# Patient Record
Sex: MALE | Race: WHITE | URBAN - METROPOLITAN AREA
[De-identification: names, ages, dates, MRNs, and addresses within clinical notes are randomized per-mention and may not be internally consistent; named-entity substitution may affect disease eponyms.]

---

## 2017-08-20 ENCOUNTER — HOSPITAL ENCOUNTER (EMERGENCY)
Facility: CLINIC | Age: 62
Discharge: HOME | End: 2017-08-20
Attending: EMERGENCY MEDICINE | Admitting: EMERGENCY MEDICINE
Payer: COMMERCIAL

## 2017-08-20 VITALS
DIASTOLIC BLOOD PRESSURE: 89 MMHG | OXYGEN SATURATION: 97 % | SYSTOLIC BLOOD PRESSURE: 164 MMHG | TEMPERATURE: 98.1 F | HEART RATE: 93 BPM | WEIGHT: 210 LBS | RESPIRATION RATE: 20 BRPM

## 2017-08-20 DIAGNOSIS — S61.012A LACERATION OF LEFT THUMB WITHOUT FOREIGN BODY WITHOUT DAMAGE TO NAIL, INITIAL ENCOUNTER: ICD-10-CM
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
## 2017-08-20 DIAGNOSIS — S61.152A: ICD-10-CM

## 2017-08-20 DIAGNOSIS — S61.259A DOG BITE OF FINGER, INITIAL ENCOUNTER: ICD-10-CM
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
## 2017-08-20 PROCEDURE — 12002 RPR S/N/AX/GEN/TRNK2.6-7.5CM: CPT | Mod: Z6 | Performed by: EMERGENCY MEDICINE

## 2017-08-20 PROCEDURE — 12002 RPR S/N/AX/GEN/TRNK2.6-7.5CM: CPT | Performed by: EMERGENCY MEDICINE

## 2017-08-20 PROCEDURE — 99283 EMERGENCY DEPT VISIT LOW MDM: CPT | Mod: 25 | Performed by: EMERGENCY MEDICINE

## 2017-08-20 RX ORDER — BUPIVACAINE HYDROCHLORIDE 2.5 MG/ML
5 INJECTION, SOLUTION EPIDURAL; INFILTRATION; INTRACAUDAL ONCE
Status: DISCONTINUED | OUTPATIENT
Start: 2017-08-20 | End: 2017-08-20 | Stop reason: HOSPADM

## 2017-08-20 NOTE — ED NOTES
Pt was  his dog and his son in laws dog during a fight.  Pt was bitten on his left 1st digit.  Dog is vaccinated.  Laceration to Thumb.

## 2017-08-20 NOTE — ED AVS SNAPSHOT
[facility]    [address] [place] [address]    Phone:  [phone]    Fax:  [phone]                                       Bernardo Shields   MRN: [id number]    Department:  [facility]   Date of Visit:  8/20/2017           Patient Information     Date Of Birth          1955        Your diagnoses for this visit were:     Dog bite of finger, initial encounter     Laceration of left thumb without foreign body without damage to nail, initial encounter        You were seen by Lesly Courtney MD.      Follow-up Information     Follow up with your clinic.    Why:  For suture removal in 7-10 days        Discharge Instructions       Keep wound clean and dry. No soaking. Okay to shower.    Antibiotics as prescribed.    Elevation will help with pain and inflammation.    Watch for any signs of infection and follow up in clinic or return to ED for concerns.    I hope you heal quickly!!    24 Hour Appointment Hotline       To make an appointment at any [facility] clinic, call [phone] ([phone]). If you don't have a family doctor or clinic, we will help you find one. [facility] clinics are conveniently located to serve the needs of you and your family.             Review of your medicines      START taking        Dose / Directions Last dose taken    amoxicillin-clavulanate 875-125 MG per tablet   Commonly known as:  AUGMENTIN   Dose:  1 tablet   Quantity:  14 tablet        Take 1 tablet by mouth 2 times daily for 7 days   Refills:  0          Our records show that you are taking the medicines listed below. If these are incorrect, please call your family doctor or clinic.        Dose / Directions Last dose taken    ADVIL PO   Dose:  600 mg        Take 600 mg by mouth   Refills:  0                Prescriptions were sent or printed at these locations (1 Prescription)                   [facility] [place], [place] -  "[address]   [address] , [place] [address]    Telephone:  [phone]   Fax:  [phone]   Hours:                  E-Prescribed (1 of 1)         amoxicillin-clavulanate (AUGMENTIN) 875-125 MG per tablet                Orders Needing Specimen Collection     None      Pending Results     No orders found from 2017 to 2017.            Pending Culture Results     No orders found from 2017 to 2017.            Pending Results Instructions     If you had any lab results that were not finalized at the time of your Discharge, you can call the ED Lab Result RN at [phone]. You will be contacted by this team for any positive Lab results or changes in treatment. The nurses are available 7 days a week from 10A to 6:30P.  You can leave a message 24 hours per day and they will return your call.        Thank you for choosing [facility]       Thank you for choosing [facility] for your care. Our goal is always to provide you with excellent care. Hearing back from our patients is one way we can continue to improve our services. Please take a few minutes to complete the written survey that you may receive in the mail after you visit with us. Thank you!        [employer] Information     [employer] lets you send messages to your doctor, view your test results, renew your prescriptions, schedule appointments and more. To sign up, go to [email or web address] . Click on \"Log in\" on the left side of the screen, which will take you to the Welcome page. Then click on \"Sign up Now\" on the right side of the page.     You will be asked to enter the access code listed below, as well as some personal information. Please follow the directions to create your username and password.     Your access code is: [email or web address]  Expires: 2017  1:12 PM     Your access code will  in 90 days. If you need help or a new code, please call your [facility] clinic or [phone].        Care EveryWhere ID     This is your Care " EveryWhere ID. This could be used by other organizations to access your [facility] medical records  [id number]        Equal Access to Services     BEA FLORES : Alex Owen, beatriz walker, shiv ruiz, akin núñez. So [facility] [phone].    ATENCIÓN: Si habla español, tiene a berger disposición servicios gratuitos de asistencia lingüística. Llame al [phone].    We comply with applicable federal civil rights laws and [place] laws. We do not discriminate on the basis of race, color, national origin, age, disability sex, sexual orientation or gender identity.            After Visit Summary       This is your record. Keep this with you and show to your community pharmacist(s) and doctor(s) at your next visit.

## 2017-08-20 NOTE — ED NOTES
Pt's and his son's dog were in an argument over food.  One was a niyah Andi Terrier and the other was a Rottweiler.  Pt tired to intervene and sustained a dog bit from the Rottweiler to his left thumb.  Pt reports Dt 5 yrs ago.  Bleeding controlled.

## 2017-08-20 NOTE — DISCHARGE INSTRUCTIONS
Keep wound clean and dry. No soaking. Okay to shower.    Antibiotics as prescribed.    Elevation will help with pain and inflammation.    Watch for any signs of infection and follow up in clinic or return to ED for concerns.    I hope you heal quickly!!

## 2017-08-20 NOTE — ED AVS SNAPSHOT
[facility]    [address] [place] [address]    Phone:  [phone]    Fax:  [phone]                                       Bernardo Shields   MRN: [id number]    Department:  [facility]   Date of Visit:  8/20/2017           After Visit Summary Signature Page     I have received my discharge instructions, and my questions have been answered. I have discussed any challenges I see with this plan with the nurse or doctor.    ..........................................................................................................................................  Patient/Patient Representative Signature      ..........................................................................................................................................  Patient Representative Print Name and Relationship to Patient    ..................................................               ................................................  Date                                            Time    ..........................................................................................................................................  Reviewed by Signature/Title    ...................................................              ..............................................  Date                                                            Time

## 2017-08-20 NOTE — ED PROVIDER NOTES
History     Chief Complaint   Patient presents with     Dog Bite     The history is provided by the patient.     This is an otherwise healthy 62-year-old male presenting after dog bite. Patient was trying to separate his dog and his son-in-law's dog while they were fighting over some food. His dog bit him on his left thumb. Bleeding is controlled. He is not on blood thinners. Dog is immunized. No other injuries or complaints. Patient is up-to-date on his tetanus.    I have reviewed the Medications, Allergies, Past Medical and Surgical History, and Social History in the Epic system.    Allergies: No Known Allergies      No current facility-administered medications on file prior to encounter.   No current outpatient prescriptions on file prior to encounter.    There is no problem list on file for this patient.      History reviewed. No pertinent surgical history.    Social History   Substance Use Topics     Smoking status: Never Smoker     Smokeless tobacco: Never Used     Alcohol use 1.2 oz/week     2 Cans of beer per week      Comment: daily         There is no immunization history on file for this patient.    BMI: There is no height or weight on file to calculate BMI.      Review of Systems     All other ROS reviewed and are negative or non-contributory except as stated in HPI.    Physical Exam   BP: 164/89  Pulse: 93  Temp: 98.1  F (36.7  C)  Resp: 20  Weight: 95.3 kg (210 lb)  SpO2: 97 %  Physical Exam   Constitutional: He is oriented to person, place, and time. He appears well-developed and well-nourished. No distress.   Pleasant, healthy appearing male sitting in chair   HENT:   Head: Normocephalic and atraumatic.   Nose: Nose normal.   Eyes: Conjunctivae and EOM are normal. No scleral icterus.   Neck: Normal range of motion. Neck supple.   Cardiovascular: Normal rate, regular rhythm and intact distal pulses.    Pulmonary/Chest: Effort normal.   Musculoskeletal:        Hands:  Neurological: He is alert and  oriented to person, place, and time. He exhibits normal muscle tone.   Skin: Skin is warm and dry.   Psychiatric: He has a normal mood and affect. His behavior is normal.   Nursing note and vitals reviewed.      ED Course (with Medical Decision Making)    Pt seen and examined by me.  RN and EPIC notes reviewed.      Patient with laceration of the thumb. This was from a dog bite. I am reluctant to close it, but I think I will try to just close the tip. There is some nail bed injury, but I am not going to expose the injury under the thumb. I am able to tuck the skin underneath. This will believe that extension below the thumbnail for drainage in case of any infection. All of this was discussed with the patient. I'm also going to put him on Augmentin. His tetanus is up-to-date. Please see procedure note. Wound care discussed. Sutures out in 7-10 days. Watch for signs of infection and follow up in clinic or return for concerns.        Laceration repair  Date/Time: 8/20/2017 9:26 PM  Performed by: IGLESIA VAZQUEZ  Authorized by: IGLESIA VAZQUEZ   Consent: Verbal consent obtained.  Consent given by: patient  Body area: upper extremity  Location details: left thumb  Laceration length: 5 cm  Vascular damage: no  Anesthesia: digital block    Anesthesia:  Local Anesthetic: bupivacaine 0.25% without epinephrine  Anesthetic total: 4 mL    Sedation:  Patient sedated: no  Preparation: Patient was prepped and draped in the usual sterile fashion.  Irrigation solution: saline  Irrigation method: syringe (1st washed with Water then irrigated with syringe)  Amount of cleaning: extensive  Debridement: none  Skin closure: 4-0 nylon  Number of sutures: 4  Technique: simple  Approximation: loose  Approximation difficulty: simple  Dressing: Per nursing.  Patient tolerance: Patient tolerated the procedure well with no immediate complications            Assessments & Plan     I have reviewed the findings, diagnosis, plan  and need for follow up with the patient.    Discharge Medication List as of 8/20/2017  1:16 PM      START taking these medications    Details   amoxicillin-clavulanate (AUGMENTIN) 875-125 MG per tablet Take 1 tablet by mouth 2 times daily for 7 days, Disp-14 tablet, R-0, E-Prescribe             Final diagnoses:   Dog bite of finger, initial encounter   Laceration of left thumb without foreign body without damage to nail, initial encounter     Disposition: Patient discharged home in stable condition. Plan as above. Return for concerns.    This document serves as a record of services personally performed by Lesly Courtney MD. It was created on their behalf by Natividad Cottrell, a trained medical scribe. The creation of this record is based on the provider's personal observations and the statements of the patient. This document has been checked and approved by the attending provider.    Note: Chart documentation done in part with Dragon Voice Recognition software. Although reviewed after completion, some word and grammatical errors may remain.    8/20/2017   [facility] EMERGENCY DEPARTMENT     Lesly Courtney MD  08/20/17 8582